# Patient Record
Sex: FEMALE | Race: WHITE | NOT HISPANIC OR LATINO | Employment: PART TIME
[De-identification: names, ages, dates, MRNs, and addresses within clinical notes are randomized per-mention and may not be internally consistent; named-entity substitution may affect disease eponyms.]

---

## 2018-05-11 PROCEDURE — 87086 URINE CULTURE/COLONY COUNT: CPT | Performed by: OBSTETRICS & GYNECOLOGY

## 2018-06-06 PROBLEM — Z98.82 H/O BREAST AUGMENTATION: Status: ACTIVE | Noted: 2018-06-06

## 2018-06-06 PROBLEM — Z34.90 PREGNANT: Status: ACTIVE | Noted: 2018-06-06

## 2018-06-06 PROBLEM — Z34.90 PREGNANT (HCC): Status: ACTIVE | Noted: 2018-06-06

## 2018-06-29 PROBLEM — Z34.03 ENCOUNTER FOR SUPERVISION OF NORMAL FIRST PREGNANCY IN THIRD TRIMESTER (HCC): Status: ACTIVE | Noted: 2018-06-29

## 2018-06-29 PROBLEM — Z34.03 ENCOUNTER FOR SUPERVISION OF NORMAL FIRST PREGNANCY IN THIRD TRIMESTER: Status: ACTIVE | Noted: 2018-06-29

## 2018-06-29 PROCEDURE — 82950 GLUCOSE TEST: CPT | Performed by: OBSTETRICS & GYNECOLOGY

## 2018-06-29 PROCEDURE — 87389 HIV-1 AG W/HIV-1&-2 AB AG IA: CPT | Performed by: OBSTETRICS & GYNECOLOGY

## 2018-07-02 PROBLEM — O99.019 ANTEPARTUM ANEMIA: Status: ACTIVE | Noted: 2018-07-02

## 2018-07-02 PROBLEM — O99.019 ANTEPARTUM ANEMIA (HCC): Status: ACTIVE | Noted: 2018-07-02

## 2018-09-20 ENCOUNTER — HOSPITAL (OUTPATIENT)
Dept: OTHER | Age: 28
End: 2018-09-20
Attending: OBSTETRICS & GYNECOLOGY

## 2018-09-20 LAB
ALBUMIN SERPL-MCNC: 2.1 GM/DL (ref 3.6–5.1)
ALBUMIN/GLOB SERPL: 0.6 {RATIO} (ref 1–2.4)
ALP SERPL-CCNC: 149 UNIT/L (ref 45–117)
ALT SERPL-CCNC: 17 UNIT/L
ANALYZER ANC (IANC): ABNORMAL
ANION GAP SERPL CALC-SCNC: 14 MMOL/L (ref 10–20)
APTT PPP: 25 SECONDS (ref 22–30)
APTT PPP: NORMAL S
AST SERPL-CCNC: 18 UNIT/L
BILIRUB SERPL-MCNC: 0.2 MG/DL (ref 0.2–1)
BUN SERPL-MCNC: 11 MG/DL (ref 6–20)
BUN/CREAT SERPL: 15 (ref 7–25)
CALCIUM SERPL-MCNC: 7.9 MG/DL (ref 8.4–10.2)
CHLORIDE: 103 MMOL/L (ref 98–107)
CO2 SERPL-SCNC: 23 MMOL/L (ref 21–32)
CREAT SERPL-MCNC: 0.75 MG/DL (ref 0.51–0.95)
ERYTHROCYTE [DISTWIDTH] IN BLOOD: 13.1 % (ref 11–15)
FIBRINOGEN PPP-MCNC: 427 MG/DL (ref 190–425)
GLOBULIN SER-MCNC: 3.6 GM/DL (ref 2–4)
GLUCOSE SERPL-MCNC: 82 MG/DL (ref 65–99)
HEMATOCRIT: 31.2 % (ref 36–46.5)
HGB BLD-MCNC: 10.1 GM/DL (ref 12–15.5)
INR PPP: 0.9
MCH RBC QN AUTO: 30.1 PG (ref 26–34)
MCHC RBC AUTO-ENTMCNC: 32.4 GM/DL (ref 32–36.5)
MCV RBC AUTO: 92.9 FL (ref 78–100)
NRBC (NRBCRE): ABNORMAL
ORGANIC ACIDS/CREAT UR-SRTO: 338.39 MG/DL
PLATELET # BLD: 243 THOUSAND/MCL (ref 140–450)
POTASSIUM SERPL-SCNC: 3.8 MMOL/L (ref 3.4–5.1)
PROT SERPL-MCNC: 5.7 GM/DL (ref 6.4–8.2)
PROT UR-MCNC: 31 MG/DL
PROT/CREAT UR: 92 MG/GM CREAT
PROTHROMBIN TIME: 9.4 SECONDS (ref 9.7–11.8)
PROTHROMBIN TIME: ABNORMAL
RBC # BLD: 3.36 MILLION/MCL (ref 4–5.2)
SERVICE CMNT-IMP: ABNORMAL
SODIUM SERPL-SCNC: 136 MMOL/L (ref 135–145)
URATE SERPL-MCNC: 5.4 MG/DL (ref 2.6–5.9)
WBC # BLD: 9.6 THOUSAND/MCL (ref 4.2–11)

## 2018-09-21 LAB
ANALYZER ANC (IANC): ABNORMAL
BASE DEFICIT BLDCOA-SCNC: 3 MMOL/L
BASE DEFICIT BLDCOV-SCNC: 5 MMOL/L
BASE EXCESS BLDCOA CALC-SCNC: NORMAL MMOL/L
BASE EXCESS-RC: ABNORMAL
ERYTHROCYTE [DISTWIDTH] IN BLOOD: 13.2 % (ref 11–15)
HCO3 BLDCOA-SCNC: 26 MMOL/L (ref 21–28)
HCO3 BLDCOV-SCNC: 21 MMOL/L (ref 22–29)
HEMATOCRIT: 26.4 % (ref 36–46.5)
HGB BLD-MCNC: 8.8 GM/DL (ref 12–15.5)
MCH RBC QN AUTO: 31.4 PG (ref 26–34)
MCHC RBC AUTO-ENTMCNC: 33.3 GM/DL (ref 32–36.5)
MCV RBC AUTO: 94.3 FL (ref 78–100)
NRBC (NRBCRE): ABNORMAL
PCO2 BLDCOA: 66 MM HG (ref 31–74)
PCO2 BLDCOV: 44 MM HG (ref 23–49)
PH BLDCOA: 7.21 UNIT (ref 7.18–7.38)
PH BLDCOV: 7.29 UNIT (ref 7.25–7.45)
PLATELET # BLD: 215 THOUSAND/MCL (ref 140–450)
PO2 BLDCOV: 32 MM HG (ref 17–41)
PO2 RC ARTERIAL CORD (RACPO): <20 MM HG (ref 6–31)
RBC # BLD: 2.8 MILLION/MCL (ref 4–5.2)
WBC # BLD: 14.1 THOUSAND/MCL (ref 4.2–11)

## 2018-09-22 LAB
ANALYZER ANC (IANC): ABNORMAL
BASOPHILS # BLD: 0 THOUSAND/MCL (ref 0–0.3)
BASOPHILS NFR BLD: 0 %
DIFFERENTIAL METHOD BLD: ABNORMAL
EOSINOPHIL # BLD: 0.2 THOUSAND/MCL (ref 0.1–0.5)
EOSINOPHIL NFR BLD: 2 %
ERYTHROCYTE [DISTWIDTH] IN BLOOD: 13.3 % (ref 11–15)
HEMATOCRIT: 24.4 % (ref 36–46.5)
HGB BLD-MCNC: 8 GM/DL (ref 12–15.5)
LYMPHOCYTES # BLD: 2.1 THOUSAND/MCL (ref 1–4.8)
LYMPHOCYTES NFR BLD: 20 %
MCH RBC QN AUTO: 30.9 PG (ref 26–34)
MCHC RBC AUTO-ENTMCNC: 32.8 GM/DL (ref 32–36.5)
MCV RBC AUTO: 94.2 FL (ref 78–100)
MONOCYTES # BLD: 0.6 THOUSAND/MCL (ref 0.3–0.9)
MONOCYTES NFR BLD: 6 %
NEUTROPHILS # BLD: 7.4 THOUSAND/MCL (ref 1.8–7.7)
NEUTROPHILS NFR BLD: 72 %
NEUTS SEG NFR BLD: ABNORMAL %
NRBC (NRBCRE): ABNORMAL
PLATELET # BLD: 207 THOUSAND/MCL (ref 140–450)
RBC # BLD: 2.59 MILLION/MCL (ref 4–5.2)
WBC # BLD: 10.3 THOUSAND/MCL (ref 4.2–11)

## 2018-09-24 ENCOUNTER — HOSPITAL (OUTPATIENT)
Dept: OTHER | Age: 28
End: 2018-09-24
Attending: OBSTETRICS & GYNECOLOGY

## 2018-09-24 LAB
ALBUMIN SERPL-MCNC: 2 GM/DL (ref 3.6–5.1)
ALBUMIN/GLOB SERPL: 0.6 {RATIO} (ref 1–2.4)
ALP SERPL-CCNC: 123 UNIT/L (ref 45–117)
ALT SERPL-CCNC: 51 UNIT/L
ANALYZER ANC (IANC): ABNORMAL
ANION GAP SERPL CALC-SCNC: 14 MMOL/L (ref 10–20)
AST SERPL-CCNC: 56 UNIT/L
BILIRUB SERPL-MCNC: 0.3 MG/DL (ref 0.2–1)
BUN SERPL-MCNC: 6 MG/DL (ref 6–20)
BUN/CREAT SERPL: 10 (ref 7–25)
CALCIUM SERPL-MCNC: 8 MG/DL (ref 8.4–10.2)
CHLORIDE: 107 MMOL/L (ref 98–107)
CO2 SERPL-SCNC: 22 MMOL/L (ref 21–32)
CREAT SERPL-MCNC: 0.63 MG/DL (ref 0.51–0.95)
ERYTHROCYTE [DISTWIDTH] IN BLOOD: 13.4 % (ref 11–15)
GLOBULIN SER-MCNC: 3.6 GM/DL (ref 2–4)
GLUCOSE SERPL-MCNC: 94 MG/DL (ref 65–99)
HEMATOCRIT: 26.1 % (ref 36–46.5)
HGB BLD-MCNC: 8.3 GM/DL (ref 12–15.5)
MCH RBC QN AUTO: 30.2 PG (ref 26–34)
MCHC RBC AUTO-ENTMCNC: 31.8 GM/DL (ref 32–36.5)
MCV RBC AUTO: 94.9 FL (ref 78–100)
NRBC (NRBCRE): ABNORMAL
PLATELET # BLD: 292 THOUSAND/MCL (ref 140–450)
POTASSIUM SERPL-SCNC: 4.1 MMOL/L (ref 3.4–5.1)
PROT SERPL-MCNC: 5.6 GM/DL (ref 6.4–8.2)
RBC # BLD: 2.75 MILLION/MCL (ref 4–5.2)
SODIUM SERPL-SCNC: 139 MMOL/L (ref 135–145)
WBC # BLD: 7.6 THOUSAND/MCL (ref 4.2–11)

## 2018-09-25 LAB
ALBUMIN SERPL-MCNC: 2.1 GM/DL (ref 3.6–5.1)
ALBUMIN/GLOB SERPL: 0.6 {RATIO} (ref 1–2.4)
ALP SERPL-CCNC: 116 UNIT/L (ref 45–117)
ALT SERPL-CCNC: 46 UNIT/L
ANALYZER ANC (IANC): ABNORMAL
ANION GAP SERPL CALC-SCNC: 15 MMOL/L (ref 10–20)
AST SERPL-CCNC: 39 UNIT/L
BILIRUB SERPL-MCNC: 0.2 MG/DL (ref 0.2–1)
BUN SERPL-MCNC: 5 MG/DL (ref 6–20)
BUN/CREAT SERPL: 9 (ref 7–25)
CALCIUM SERPL-MCNC: 8 MG/DL (ref 8.4–10.2)
CHLORIDE: 107 MMOL/L (ref 98–107)
CO2 SERPL-SCNC: 22 MMOL/L (ref 21–32)
CREAT SERPL-MCNC: 0.56 MG/DL (ref 0.51–0.95)
ERYTHROCYTE [DISTWIDTH] IN BLOOD: 13.5 % (ref 11–15)
GLOBULIN SER-MCNC: 3.6 GM/DL (ref 2–4)
GLUCOSE SERPL-MCNC: 79 MG/DL (ref 65–99)
HEMATOCRIT: 25.5 % (ref 36–46.5)
HGB BLD-MCNC: 8.3 GM/DL (ref 12–15.5)
MCH RBC QN AUTO: 30.7 PG (ref 26–34)
MCHC RBC AUTO-ENTMCNC: 32.5 GM/DL (ref 32–36.5)
MCV RBC AUTO: 94.4 FL (ref 78–100)
NRBC (NRBCRE): ABNORMAL
PLATELET # BLD: 306 THOUSAND/MCL (ref 140–450)
POTASSIUM SERPL-SCNC: 3.8 MMOL/L (ref 3.4–5.1)
PROT SERPL-MCNC: 5.7 GM/DL (ref 6.4–8.2)
RBC # BLD: 2.7 MILLION/MCL (ref 4–5.2)
SODIUM SERPL-SCNC: 140 MMOL/L (ref 135–145)
WBC # BLD: 8.2 THOUSAND/MCL (ref 4.2–11)

## 2018-11-02 PROCEDURE — 88175 CYTOPATH C/V AUTO FLUID REDO: CPT | Performed by: OBSTETRICS & GYNECOLOGY

## 2019-08-20 ENCOUNTER — WALK IN (OUTPATIENT)
Dept: URGENT CARE | Age: 29
End: 2019-08-20

## 2019-08-20 DIAGNOSIS — J02.9 SORE THROAT: Primary | ICD-10-CM

## 2019-08-20 LAB
INTERNAL PROCEDURAL CONTROLS ACCEPTABLE: YES
S PYO AG THROAT QL IA.RAPID: NEGATIVE

## 2019-08-20 PROCEDURE — 99203 OFFICE O/P NEW LOW 30 MIN: CPT | Performed by: FAMILY MEDICINE

## 2019-08-20 PROCEDURE — 87880 STREP A ASSAY W/OPTIC: CPT | Performed by: FAMILY MEDICINE

## 2019-08-20 ASSESSMENT — PAIN SCALES - GENERAL: PAINLEVEL: 3-4

## 2020-12-27 ENCOUNTER — EMPLOYEE HEALTH (OUTPATIENT)
Dept: OTHER | Age: 30
End: 2020-12-27

## 2021-02-11 ENCOUNTER — WALK IN (OUTPATIENT)
Dept: URGENT CARE | Age: 31
End: 2021-02-11

## 2021-02-11 DIAGNOSIS — S16.1XXA STRAIN OF NECK MUSCLE, INITIAL ENCOUNTER: Primary | ICD-10-CM

## 2021-02-11 PROCEDURE — 99214 OFFICE O/P EST MOD 30 MIN: CPT | Performed by: INTERNAL MEDICINE

## 2021-02-11 RX ORDER — CYCLOBENZAPRINE HCL 10 MG
10 TABLET ORAL 3 TIMES DAILY PRN
Qty: 15 TABLET | Refills: 0 | Status: SHIPPED | OUTPATIENT
Start: 2021-02-11 | End: 2023-08-10 | Stop reason: CLARIF

## 2021-02-11 RX ORDER — DICLOFENAC SODIUM 75 MG/1
75 TABLET, DELAYED RELEASE ORAL 2 TIMES DAILY
Qty: 30 TABLET | Refills: 0 | Status: SHIPPED | OUTPATIENT
Start: 2021-02-11 | End: 2023-08-10 | Stop reason: CLARIF

## 2021-02-11 ASSESSMENT — PAIN SCALES - GENERAL: PAINLEVEL: 1-2

## 2021-05-25 VITALS
RESPIRATION RATE: 16 BRPM | OXYGEN SATURATION: 97 % | HEART RATE: 109 BPM | SYSTOLIC BLOOD PRESSURE: 134 MMHG | HEART RATE: 88 BPM | TEMPERATURE: 98.8 F | OXYGEN SATURATION: 98 % | DIASTOLIC BLOOD PRESSURE: 76 MMHG | DIASTOLIC BLOOD PRESSURE: 77 MMHG | RESPIRATION RATE: 18 BRPM | SYSTOLIC BLOOD PRESSURE: 143 MMHG | TEMPERATURE: 98.4 F

## 2021-11-11 ENCOUNTER — OCC HEALTH (OUTPATIENT)
Dept: OCCUPATIONAL MEDICINE | Age: 31
End: 2021-11-11
Attending: PREVENTIVE MEDICINE

## 2021-11-13 ENCOUNTER — OFFICE VISIT (OUTPATIENT)
Dept: OCCUPATIONAL MEDICINE | Age: 31
End: 2021-11-13
Attending: FAMILY MEDICINE

## 2022-04-24 ENCOUNTER — V-VISIT (OUTPATIENT)
Dept: FAMILY MEDICINE | Age: 32
End: 2022-04-24

## 2022-04-24 DIAGNOSIS — R69 DIAGNOSIS DEFERRED: Primary | ICD-10-CM

## 2022-12-28 ENCOUNTER — EXTERNAL RECORD (OUTPATIENT)
Dept: OTHER | Age: 32
End: 2022-12-28

## 2023-01-01 ENCOUNTER — HOSPITAL ENCOUNTER (OUTPATIENT)
Dept: LAB | Age: 33
Discharge: HOME | End: 2023-01-01

## 2023-01-01 DIAGNOSIS — Z32.00 ENCOUNTER FOR PREGNANCY TEST, RESULT UNKNOWN: Primary | ICD-10-CM

## 2023-01-01 LAB — HCG SERPL-ACNC: 260 MUNITS/ML

## 2023-01-01 PROCEDURE — 36415 COLL VENOUS BLD VENIPUNCTURE: CPT | Performed by: OBSTETRICS & GYNECOLOGY

## 2023-01-01 PROCEDURE — 84702 CHORIONIC GONADOTROPIN TEST: CPT | Performed by: OBSTETRICS & GYNECOLOGY

## 2023-01-05 ENCOUNTER — LAB REQUISITION (OUTPATIENT)
Dept: OCCUPATIONAL MEDICINE | Age: 33
End: 2023-01-05

## 2023-01-05 DIAGNOSIS — Z02.1 ENCOUNTER FOR PRE-EMPLOYMENT EXAMINATION: ICD-10-CM

## 2023-01-05 PROCEDURE — 86480 TB TEST CELL IMMUN MEASURE: CPT | Performed by: CLINICAL MEDICAL LABORATORY

## 2023-01-05 PROCEDURE — PSEU9049 QUANTIFERON TB PLUS: Performed by: CLINICAL MEDICAL LABORATORY

## 2023-01-07 LAB
GAMMA INTERFERON BACKGROUND BLD IA-ACNC: 0.04 IU/ML
M TB IFN-G BLD-IMP: NEGATIVE
M TB IFN-G CD4+ BCKGRND COR BLD-ACNC: 0 IU/ML
M TB IFN-G CD4+CD8+ BCKGRND COR BLD-ACNC: 0 IU/ML
MITOGEN IGNF BCKGRD COR BLD-ACNC: >10 IU/ML

## 2023-06-01 LAB
ABO + RH BLD: NORMAL
BLD GP AB SCN SERPL QL GEL: NEGATIVE
HBV SURFACE AG SER QL: NEGATIVE
HIV 1+2 AB+HIV1 P24 AG SERPL QL IA: NORMAL
RPR SER QL: NORMAL
RUBV IGG SERPL IA-ACNC: POSITIVE

## 2023-06-30 ENCOUNTER — TELEPHONE (OUTPATIENT)
Dept: PERINATAL CARE | Facility: HOSPITAL | Age: 33
End: 2023-06-30

## 2023-08-10 ENCOUNTER — APPOINTMENT (OUTPATIENT)
Dept: GENERAL RADIOLOGY | Age: 33
End: 2023-08-10
Attending: EMERGENCY MEDICINE

## 2023-08-10 ENCOUNTER — HOSPITAL ENCOUNTER (EMERGENCY)
Age: 33
Discharge: HOME | End: 2023-08-10
Attending: EMERGENCY MEDICINE

## 2023-08-10 VITALS
SYSTOLIC BLOOD PRESSURE: 123 MMHG | TEMPERATURE: 98.4 F | WEIGHT: 127.87 LBS | HEIGHT: 66 IN | BODY MASS INDEX: 20.55 KG/M2 | HEART RATE: 89 BPM | RESPIRATION RATE: 16 BRPM | DIASTOLIC BLOOD PRESSURE: 70 MMHG | OXYGEN SATURATION: 97 %

## 2023-08-10 DIAGNOSIS — R07.9 CHEST PAIN, UNSPECIFIED TYPE: Primary | ICD-10-CM

## 2023-08-10 LAB
ALBUMIN SERPL-MCNC: 2.9 G/DL (ref 3.6–5.1)
ALBUMIN/GLOB SERPL: 0.8 {RATIO} (ref 1–2.4)
ALP SERPL-CCNC: 44 UNITS/L (ref 45–117)
ALT SERPL-CCNC: 15 UNITS/L
ANION GAP SERPL CALC-SCNC: 8 MMOL/L (ref 7–19)
AST SERPL-CCNC: 14 UNITS/L
BASOPHILS # BLD: 0 K/MCL (ref 0–0.3)
BASOPHILS NFR BLD: 0 %
BILIRUB SERPL-MCNC: 0.2 MG/DL (ref 0.2–1)
BUN SERPL-MCNC: 8 MG/DL (ref 6–20)
BUN/CREAT SERPL: 15 (ref 7–25)
CALCIUM SERPL-MCNC: 8.2 MG/DL (ref 8.4–10.2)
CHLORIDE SERPL-SCNC: 108 MMOL/L (ref 97–110)
CO2 SERPL-SCNC: 25 MMOL/L (ref 21–32)
CREAT SERPL-MCNC: 0.54 MG/DL (ref 0.51–0.95)
D DIMER PPP FEU-MCNC: 0.37 MG/L (FEU)
DEPRECATED RDW RBC: 43.9 FL (ref 39–50)
EOSINOPHIL # BLD: 0.1 K/MCL (ref 0–0.5)
EOSINOPHIL NFR BLD: 1 %
ERYTHROCYTE [DISTWIDTH] IN BLOOD: 12.6 % (ref 11–15)
FASTING DURATION TIME PATIENT: ABNORMAL H
GFR SERPLBLD BASED ON 1.73 SQ M-ARVRAT: >90 ML/MIN
GLOBULIN SER-MCNC: 3.7 G/DL (ref 2–4)
GLUCOSE SERPL-MCNC: 64 MG/DL (ref 70–99)
HCT VFR BLD CALC: 32.2 % (ref 36–46.5)
HGB BLD-MCNC: 10.5 G/DL (ref 12–15.5)
IMM GRANULOCYTES # BLD AUTO: 0 K/MCL (ref 0–0.2)
IMM GRANULOCYTES # BLD: 0 %
LYMPHOCYTES # BLD: 2.2 K/MCL (ref 1–4.8)
LYMPHOCYTES NFR BLD: 28 %
MCH RBC QN AUTO: 31.3 PG (ref 26–34)
MCHC RBC AUTO-ENTMCNC: 32.6 G/DL (ref 32–36.5)
MCV RBC AUTO: 95.8 FL (ref 78–100)
MONOCYTES # BLD: 0.5 K/MCL (ref 0.3–0.9)
MONOCYTES NFR BLD: 6 %
NEUTROPHILS # BLD: 5.3 K/MCL (ref 1.8–7.7)
NEUTROPHILS NFR BLD: 65 %
NRBC BLD MANUAL-RTO: 0 /100 WBC
PLATELET # BLD AUTO: 315 K/MCL (ref 140–450)
POTASSIUM SERPL-SCNC: 3.8 MMOL/L (ref 3.4–5.1)
PROT SERPL-MCNC: 6.6 G/DL (ref 6.4–8.2)
RBC # BLD: 3.36 MIL/MCL (ref 4–5.2)
SODIUM SERPL-SCNC: 137 MMOL/L (ref 135–145)
TROPONIN I SERPL DL<=0.01 NG/ML-MCNC: <4 NG/L
WBC # BLD: 8.1 K/MCL (ref 4.2–11)

## 2023-08-10 PROCEDURE — 85379 FIBRIN DEGRADATION QUANT: CPT | Performed by: EMERGENCY MEDICINE

## 2023-08-10 PROCEDURE — 85025 COMPLETE CBC W/AUTO DIFF WBC: CPT | Performed by: EMERGENCY MEDICINE

## 2023-08-10 PROCEDURE — 99283 EMERGENCY DEPT VISIT LOW MDM: CPT

## 2023-08-10 PROCEDURE — 84484 ASSAY OF TROPONIN QUANT: CPT | Performed by: EMERGENCY MEDICINE

## 2023-08-10 PROCEDURE — 93005 ELECTROCARDIOGRAM TRACING: CPT | Performed by: EMERGENCY MEDICINE

## 2023-08-10 PROCEDURE — 72072 X-RAY EXAM THORAC SPINE 3VWS: CPT

## 2023-08-10 PROCEDURE — 10002807 HB RX 258: Performed by: EMERGENCY MEDICINE

## 2023-08-10 PROCEDURE — 71046 X-RAY EXAM CHEST 2 VIEWS: CPT

## 2023-08-10 PROCEDURE — 36415 COLL VENOUS BLD VENIPUNCTURE: CPT

## 2023-08-10 PROCEDURE — 80053 COMPREHEN METABOLIC PANEL: CPT | Performed by: EMERGENCY MEDICINE

## 2023-08-10 RX ADMIN — SODIUM CHLORIDE 1000 ML: 9 INJECTION, SOLUTION INTRAVENOUS at 17:08

## 2023-08-11 LAB
ATRIAL RATE (BPM): 93
P AXIS (DEGREES): 53
PR-INTERVAL (MSEC): 102
QRS-INTERVAL (MSEC): 68
QT-INTERVAL (MSEC): 340
QTC: 423
R AXIS (DEGREES): 70
RAINBOW EXTRA TUBES HOLD SPECIMEN: NORMAL
REPORT TEXT: NORMAL
T AXIS (DEGREES): 56
VENTRICULAR RATE EKG/MIN (BPM): 93

## 2023-10-02 LAB
HIV 1+2 AB+HIV1 P24 AG SERPL QL IA: NORMAL
RPR SER QL: NORMAL

## 2023-11-29 LAB — GBS EXTERNAL: NEGATIVE

## 2023-12-20 ENCOUNTER — HOSPITAL ENCOUNTER (INPATIENT)
Age: 33
LOS: 2 days | Discharge: HOME | End: 2023-12-22
Attending: OBSTETRICS & GYNECOLOGY | Admitting: OBSTETRICS & GYNECOLOGY

## 2023-12-20 ENCOUNTER — ANESTHESIA EVENT (OUTPATIENT)
Dept: OBGYN | Age: 33
End: 2023-12-20

## 2023-12-20 ENCOUNTER — ANESTHESIA (OUTPATIENT)
Dept: OBGYN | Age: 33
End: 2023-12-20

## 2023-12-20 LAB
ABO + RH BLD: NORMAL
ALBUMIN SERPL-MCNC: 2.3 G/DL (ref 3.6–5.1)
ALBUMIN/GLOB SERPL: 0.6 {RATIO} (ref 1–2.4)
ALP SERPL-CCNC: 140 UNITS/L (ref 45–117)
ALT SERPL-CCNC: 18 UNITS/L
ANION GAP SERPL CALC-SCNC: 11 MMOL/L (ref 7–19)
AST SERPL-CCNC: 14 UNITS/L
BASOPHILS # BLD: 0 K/MCL (ref 0–0.3)
BASOPHILS NFR BLD: 0 %
BILIRUB SERPL-MCNC: 0.2 MG/DL (ref 0.2–1)
BLD GP AB SCN SERPL QL GEL: NEGATIVE
BUN SERPL-MCNC: 7 MG/DL (ref 6–20)
BUN/CREAT SERPL: 14 (ref 7–25)
CALCIUM SERPL-MCNC: 7.9 MG/DL (ref 8.4–10.2)
CHLORIDE SERPL-SCNC: 109 MMOL/L (ref 97–110)
CO2 SERPL-SCNC: 21 MMOL/L (ref 21–32)
CREAT SERPL-MCNC: 0.49 MG/DL (ref 0.51–0.95)
CREAT UR-MCNC: 170 MG/DL
DEPRECATED RDW RBC: 43.7 FL (ref 39–50)
EGFRCR SERPLBLD CKD-EPI 2021: >90 ML/MIN/{1.73_M2}
EOSINOPHIL # BLD: 0.1 K/MCL (ref 0–0.5)
EOSINOPHIL NFR BLD: 1 %
ERYTHROCYTE [DISTWIDTH] IN BLOOD: 13 % (ref 11–15)
FASTING DURATION TIME PATIENT: ABNORMAL H
FIBRINOGEN PPP-MCNC: 466 MG/DL (ref 190–425)
GLOBULIN SER-MCNC: 3.9 G/DL (ref 2–4)
GLUCOSE SERPL-MCNC: 130 MG/DL (ref 70–99)
HCT VFR BLD CALC: 29.9 % (ref 36–46.5)
HGB BLD-MCNC: 9.6 G/DL (ref 12–15.5)
IMM GRANULOCYTES # BLD AUTO: 0.1 K/MCL (ref 0–0.2)
IMM GRANULOCYTES # BLD: 1 %
LYMPHOCYTES # BLD: 2.4 K/MCL (ref 1–4.8)
LYMPHOCYTES NFR BLD: 27 %
MCH RBC QN AUTO: 29.8 PG (ref 26–34)
MCHC RBC AUTO-ENTMCNC: 32.1 G/DL (ref 32–36.5)
MCV RBC AUTO: 92.9 FL (ref 78–100)
MONOCYTES # BLD: 0.5 K/MCL (ref 0.3–0.9)
MONOCYTES NFR BLD: 6 %
NEUTROPHILS # BLD: 5.7 K/MCL (ref 1.8–7.7)
NEUTROPHILS NFR BLD: 65 %
NRBC BLD MANUAL-RTO: 0 /100 WBC
PLATELET # BLD AUTO: 278 K/MCL (ref 140–450)
POTASSIUM SERPL-SCNC: 3.4 MMOL/L (ref 3.4–5.1)
PROT SERPL-MCNC: 6.2 G/DL (ref 6.4–8.2)
PROT UR-MCNC: 21 MG/DL
PROT/CREAT UR: 124 MGPR/GCR
RAINBOW EXTRA TUBES HOLD SPECIMEN: NORMAL
RBC # BLD: 3.22 MIL/MCL (ref 4–5.2)
SODIUM SERPL-SCNC: 138 MMOL/L (ref 135–145)
TYPE AND SCREEN EXPIRATION DATE: NORMAL
URATE SERPL-MCNC: 3.7 MG/DL (ref 2.6–5.9)
WBC # BLD: 8.7 K/MCL (ref 4.2–11)

## 2023-12-20 PROCEDURE — 13001455 HB PERINATAL CARE HIGH RISK

## 2023-12-20 PROCEDURE — 85384 FIBRINOGEN ACTIVITY: CPT | Performed by: OBSTETRICS & GYNECOLOGY

## 2023-12-20 PROCEDURE — 86901 BLOOD TYPING SEROLOGIC RH(D): CPT | Performed by: OBSTETRICS & GYNECOLOGY

## 2023-12-20 PROCEDURE — 10907ZC DRAINAGE OF AMNIOTIC FLUID, THERAPEUTIC FROM PRODUCTS OF CONCEPTION, VIA NATURAL OR ARTIFICIAL OPENING: ICD-10-PCS | Performed by: OBSTETRICS & GYNECOLOGY

## 2023-12-20 PROCEDURE — 10000003 HB ROOM CHARGE WOMEN'S HEALTH

## 2023-12-20 PROCEDURE — 86900 BLOOD TYPING SEROLOGIC ABO: CPT | Performed by: OBSTETRICS & GYNECOLOGY

## 2023-12-20 PROCEDURE — 84156 ASSAY OF PROTEIN URINE: CPT | Performed by: OBSTETRICS & GYNECOLOGY

## 2023-12-20 PROCEDURE — 13000012 HB ANESTHESIA SPINAL/EPIDURAL IN L&D: Performed by: GENERAL ACUTE CARE HOSPITAL

## 2023-12-20 PROCEDURE — 85025 COMPLETE CBC W/AUTO DIFF WBC: CPT | Performed by: OBSTETRICS & GYNECOLOGY

## 2023-12-20 PROCEDURE — 10002801 HB RX 250 W/O HCPCS: Performed by: GENERAL ACUTE CARE HOSPITAL

## 2023-12-20 PROCEDURE — 84550 ASSAY OF BLOOD/URIC ACID: CPT | Performed by: OBSTETRICS & GYNECOLOGY

## 2023-12-20 PROCEDURE — 80053 COMPREHEN METABOLIC PANEL: CPT | Performed by: OBSTETRICS & GYNECOLOGY

## 2023-12-20 PROCEDURE — 13000001 HB PHASE II RECOVERY EA 30 MINUTES

## 2023-12-20 PROCEDURE — 10002807 HB RX 258: Performed by: OBSTETRICS & GYNECOLOGY

## 2023-12-20 PROCEDURE — 10002800 HB RX 250 W HCPCS: Performed by: OBSTETRICS & GYNECOLOGY

## 2023-12-20 PROCEDURE — 10002800 HB RX 250 W HCPCS: Performed by: GENERAL ACUTE CARE HOSPITAL

## 2023-12-20 PROCEDURE — 13003251 HB VAGINAL DELIVERY HIGH RISK

## 2023-12-20 RX ORDER — LIDOCAINE HYDROCHLORIDE AND EPINEPHRINE 15; 5 MG/ML; UG/ML
INJECTION, SOLUTION EPIDURAL PRN
Status: DISCONTINUED | OUTPATIENT
Start: 2023-12-20 | End: 2023-12-20

## 2023-12-20 RX ORDER — ACETAMINOPHEN 325 MG/1
650 TABLET ORAL EVERY 6 HOURS
Status: DISCONTINUED | OUTPATIENT
Start: 2023-12-20 | End: 2023-12-22 | Stop reason: HOSPADM

## 2023-12-20 RX ORDER — NALBUPHINE HYDROCHLORIDE 10 MG/ML
2.5 INJECTION, SOLUTION INTRAMUSCULAR; INTRAVENOUS; SUBCUTANEOUS
Status: DISCONTINUED | OUTPATIENT
Start: 2023-12-20 | End: 2023-12-20

## 2023-12-20 RX ORDER — HYDROCORTISONE ACETATE PRAMOXINE HCL 2.5; 1 G/100G; G/100G
CREAM TOPICAL 3 TIMES DAILY PRN
Status: DISCONTINUED | OUTPATIENT
Start: 2023-12-20 | End: 2023-12-22 | Stop reason: HOSPADM

## 2023-12-20 RX ORDER — IBUPROFEN 600 MG/1
600 TABLET ORAL EVERY 6 HOURS
Status: DISCONTINUED | OUTPATIENT
Start: 2023-12-21 | End: 2023-12-22 | Stop reason: HOSPADM

## 2023-12-20 RX ORDER — ONDANSETRON 2 MG/ML
4 INJECTION INTRAMUSCULAR; INTRAVENOUS 2 TIMES DAILY PRN
Status: DISCONTINUED | OUTPATIENT
Start: 2023-12-20 | End: 2023-12-22 | Stop reason: HOSPADM

## 2023-12-20 RX ORDER — BUPIVACAINE HYDROCHLORIDE 2.5 MG/ML
10 INJECTION, SOLUTION EPIDURAL; INFILTRATION; INTRACAUDAL ONCE
Status: COMPLETED | OUTPATIENT
Start: 2023-12-20 | End: 2023-12-20

## 2023-12-20 RX ORDER — CALCIUM CARBONATE 500 MG/1
500 TABLET, CHEWABLE ORAL EVERY 4 HOURS PRN
Status: DISCONTINUED | OUTPATIENT
Start: 2023-12-20 | End: 2023-12-22 | Stop reason: HOSPADM

## 2023-12-20 RX ORDER — 0.9 % SODIUM CHLORIDE 0.9 %
2 VIAL (ML) INJECTION EVERY 12 HOURS SCHEDULED
Status: DISCONTINUED | OUTPATIENT
Start: 2023-12-20 | End: 2023-12-20

## 2023-12-20 RX ORDER — SIMETHICONE 125 MG
125 TABLET,CHEWABLE ORAL EVERY 4 HOURS PRN
Status: DISCONTINUED | OUTPATIENT
Start: 2023-12-20 | End: 2023-12-22 | Stop reason: HOSPADM

## 2023-12-20 RX ORDER — BUPIVACAINE HYDROCHLORIDE 2.5 MG/ML
INJECTION, SOLUTION EPIDURAL; INFILTRATION; INTRACAUDAL PRN
Status: DISCONTINUED | OUTPATIENT
Start: 2023-12-20 | End: 2023-12-20

## 2023-12-20 RX ORDER — OXYTOCIN 10 [USP'U]/ML
10 INJECTION, SOLUTION INTRAMUSCULAR; INTRAVENOUS
Status: DISCONTINUED | OUTPATIENT
Start: 2023-12-20 | End: 2023-12-22 | Stop reason: HOSPADM

## 2023-12-20 RX ORDER — KETOROLAC TROMETHAMINE 30 MG/ML
30 INJECTION, SOLUTION INTRAMUSCULAR; INTRAVENOUS ONCE
Status: COMPLETED | OUTPATIENT
Start: 2023-12-20 | End: 2023-12-20

## 2023-12-20 RX ORDER — VITAMIN A ACETATE, BETA CAROTENE, ASCORBIC ACID, CHOLECALCIFEROL, .ALPHA.-TOCOPHEROL ACETATE, DL-, THIAMINE MONONITRATE, RIBOFLAVIN, NIACINAMIDE, PYRIDOXINE HYDROCHLORIDE, FOLIC ACID, CYANOCOBALAMIN, CALCIUM CARBONATE, FERROUS FUMARATE, ZINC OXIDE, CUPRIC OXIDE 3080; 12; 120; 400; 1; 1.84; 3; 20; 22; 920; 25; 200; 27; 10; 2 [IU]/1; UG/1; MG/1; [IU]/1; MG/1; MG/1; MG/1; MG/1; MG/1; [IU]/1; MG/1; MG/1; MG/1; MG/1; MG/1
1 TABLET, FILM COATED ORAL DAILY
COMMUNITY

## 2023-12-20 RX ORDER — ONDANSETRON 2 MG/ML
4 INJECTION INTRAMUSCULAR; INTRAVENOUS 2 TIMES DAILY PRN
Status: DISCONTINUED | OUTPATIENT
Start: 2023-12-20 | End: 2023-12-20

## 2023-12-20 RX ORDER — OXYTOCIN 10 [USP'U]/ML
10 INJECTION, SOLUTION INTRAMUSCULAR; INTRAVENOUS
Status: DISCONTINUED | OUTPATIENT
Start: 2023-12-20 | End: 2023-12-20

## 2023-12-20 RX ORDER — LIDOCAINE HYDROCHLORIDE 10 MG/ML
30 INJECTION, SOLUTION EPIDURAL; INFILTRATION; INTRACAUDAL; PERINEURAL
Status: DISCONTINUED | OUTPATIENT
Start: 2023-12-20 | End: 2023-12-20

## 2023-12-20 RX ORDER — OXYTOCIN-SODIUM CHLORIDE 0.9% IV SOLN 30 UNIT/500ML 30-0.9/5 UT/ML-%
0-334 SOLUTION INTRAVENOUS CONTINUOUS
Status: DISCONTINUED | OUTPATIENT
Start: 2023-12-20 | End: 2023-12-20

## 2023-12-20 RX ORDER — OXYTOCIN-SODIUM CHLORIDE 0.9% IV SOLN 30 UNIT/500ML 30-0.9/5 UT/ML-%
0-334 SOLUTION INTRAVENOUS CONTINUOUS
Status: DISCONTINUED | OUTPATIENT
Start: 2023-12-20 | End: 2023-12-22 | Stop reason: HOSPADM

## 2023-12-20 RX ORDER — OXYTOCIN/0.9 % SODIUM CHLORIDE 30/500 ML
0-25 PLASTIC BAG, INJECTION (ML) INTRAVENOUS CONTINUOUS
Status: DISCONTINUED | OUTPATIENT
Start: 2023-12-20 | End: 2023-12-20

## 2023-12-20 RX ORDER — BUPIVACAINE HYDROCHLORIDE 2.5 MG/ML
INJECTION, SOLUTION EPIDURAL; INFILTRATION; INTRACAUDAL
Status: COMPLETED
Start: 2023-12-20 | End: 2023-12-20

## 2023-12-20 RX ORDER — HEPARIN SOD,PORK IN 0.45% NACL 25000/500
INTRAVENOUS SOLUTION INTRAVENOUS CONTINUOUS
Status: DISCONTINUED | OUTPATIENT
Start: 2023-12-20 | End: 2023-12-20

## 2023-12-20 RX ORDER — SODIUM CHLORIDE, SODIUM LACTATE, POTASSIUM CHLORIDE, CALCIUM CHLORIDE 600; 310; 30; 20 MG/100ML; MG/100ML; MG/100ML; MG/100ML
INJECTION, SOLUTION INTRAVENOUS CONTINUOUS
Status: DISCONTINUED | OUTPATIENT
Start: 2023-12-20 | End: 2023-12-20

## 2023-12-20 RX ORDER — CALCIUM CARBONATE 500 MG/1
500 TABLET, CHEWABLE ORAL EVERY 4 HOURS PRN
Status: DISCONTINUED | OUTPATIENT
Start: 2023-12-20 | End: 2023-12-20

## 2023-12-20 RX ADMIN — SODIUM CHLORIDE, POTASSIUM CHLORIDE, SODIUM LACTATE AND CALCIUM CHLORIDE: 600; 310; 30; 20 INJECTION, SOLUTION INTRAVENOUS at 06:24

## 2023-12-20 RX ADMIN — LIDOCAINE HYDROCHLORIDE,EPINEPHRINE BITARTRATE 2 ML: 15; .005 INJECTION, SOLUTION EPIDURAL; INFILTRATION; INTRACAUDAL; PERINEURAL at 11:49

## 2023-12-20 RX ADMIN — Medication 10 ML/HR: at 11:49

## 2023-12-20 RX ADMIN — Medication 200 MCG: at 19:20

## 2023-12-20 RX ADMIN — SODIUM CHLORIDE, POTASSIUM CHLORIDE, SODIUM LACTATE AND CALCIUM CHLORIDE: 600; 310; 30; 20 INJECTION, SOLUTION INTRAVENOUS at 11:29

## 2023-12-20 RX ADMIN — BUPIVACAINE HYDROCHLORIDE 10 ML: 2.5 INJECTION, SOLUTION EPIDURAL; INFILTRATION; INTRACAUDAL at 14:07

## 2023-12-20 RX ADMIN — BUPIVACAINE HYDROCHLORIDE 5 ML: 2.5 INJECTION, SOLUTION EPIDURAL; INFILTRATION; INTRACAUDAL; PERINEURAL at 12:09

## 2023-12-20 RX ADMIN — BUPIVACAINE HYDROCHLORIDE 5 ML: 2.5 INJECTION, SOLUTION EPIDURAL; INFILTRATION; INTRACAUDAL; PERINEURAL at 11:49

## 2023-12-20 RX ADMIN — LIDOCAINE HYDROCHLORIDE,EPINEPHRINE BITARTRATE 3 ML: 15; .005 INJECTION, SOLUTION EPIDURAL; INFILTRATION; INTRACAUDAL; PERINEURAL at 11:47

## 2023-12-20 RX ADMIN — BUPIVACAINE HYDROCHLORIDE 10 ML: 2.5 INJECTION, SOLUTION EPIDURAL; INFILTRATION; INTRACAUDAL at 13:57

## 2023-12-20 RX ADMIN — Medication 2 MILLI-UNITS/MIN: at 06:25

## 2023-12-20 RX ADMIN — Medication 95 ML/HR: at 22:55

## 2023-12-20 RX ADMIN — KETOROLAC TROMETHAMINE 30 MG: 30 INJECTION, SOLUTION INTRAMUSCULAR at 20:47

## 2023-12-20 RX ADMIN — SODIUM CHLORIDE, POTASSIUM CHLORIDE, SODIUM LACTATE AND CALCIUM CHLORIDE: 600; 310; 30; 20 INJECTION, SOLUTION INTRAVENOUS at 18:06

## 2023-12-20 SDOH — ECONOMIC STABILITY: HOUSING INSECURITY: DO YOU HAVE PROBLEMS WITH ANY OF THE FOLLOWING?: NONE OF THE ABOVE

## 2023-12-20 SDOH — ECONOMIC STABILITY: FOOD INSECURITY: WITHIN THE PAST 12 MONTHS, THE FOOD YOU BOUGHT JUST DIDN'T LAST AND YOU DIDN'T HAVE MONEY TO GET MORE.: NEVER TRUE

## 2023-12-20 SDOH — ECONOMIC STABILITY: HOUSING INSECURITY: WHAT IS YOUR LIVING SITUATION TODAY?: I HAVE A STEADY PLACE TO LIVE

## 2023-12-20 SDOH — HEALTH STABILITY: GENERAL: BECAUSE OF A PHYSICAL, MENTAL, OR EMOTIONAL CONDITION, DO YOU HAVE DIFFICULTY DOING ERRANDS ALONE?: NO

## 2023-12-20 SDOH — ECONOMIC STABILITY: GENERAL: WOULD YOU LIKE HELP WITH ANY OF THE FOLLOWING NEEDS?: I DON'T WANT HELP WITH ANY OF THESE

## 2023-12-20 SDOH — ECONOMIC STABILITY: HOUSING INSECURITY: WHAT IS YOUR LIVING SITUATION TODAY?: SPOUSE;CHILDREN

## 2023-12-20 SDOH — ECONOMIC STABILITY: TRANSPORTATION INSECURITY
IN THE PAST 12 MONTHS, HAS LACK OF RELIABLE TRANSPORTATION KEPT YOU FROM MEDICAL APPOINTMENTS, MEETINGS, WORK OR FROM GETTING THINGS NEEDED FOR DAILY LIVING?: NO

## 2023-12-20 SDOH — SOCIAL STABILITY: SOCIAL NETWORK: SUPPORT SYSTEMS: SIGNIFICANT OTHER;FAMILY MEMBERS;PARENT

## 2023-12-20 SDOH — HEALTH STABILITY: GENERAL
BECAUSE OF A PHYSICAL, MENTAL, OR EMOTIONAL CONDITION, DO YOU HAVE SERIOUS DIFFICULTY CONCENTRATING, REMEMBERING OR MAKING DECISIONS?: NO

## 2023-12-20 SDOH — ECONOMIC STABILITY: HOUSING INSECURITY: WHAT IS YOUR LIVING SITUATION TODAY?: HOUSE

## 2023-12-20 SDOH — SOCIAL STABILITY: SOCIAL NETWORK
HOW OFTEN DO YOU SEE OR TALK TO PEOPLE THAT YOU CARE ABOUT AND FEEL CLOSE TO? (FOR EXAMPLE: TALKING TO FRIENDS ON THE PHONE, VISITING FRIENDS OR FAMILY, GOING TO CHURCH OR CLUB MEETINGS): 5 OR MORE TIMES A WEEK

## 2023-12-20 SDOH — ECONOMIC STABILITY: GENERAL

## 2023-12-20 SDOH — SOCIAL STABILITY: SOCIAL NETWORK: SUPPORT SYSTEMS: SPOUSE;FAMILY MEMBERS;CHILDREN

## 2023-12-20 SDOH — HEALTH STABILITY: PHYSICAL HEALTH: DO YOU HAVE SERIOUS DIFFICULTY WALKING OR CLIMBING STAIRS?: NO

## 2023-12-20 SDOH — HEALTH STABILITY: PHYSICAL HEALTH: DO YOU HAVE DIFFICULTY DRESSING OR BATHING?: NO

## 2023-12-20 SDOH — SOCIAL STABILITY: SOCIAL INSECURITY: HOW OFTEN DOES ANYONE, INCLUDING FAMILY AND FRIENDS, PHYSICALLY HURT YOU?: NEVER

## 2023-12-20 SDOH — SOCIAL STABILITY: SOCIAL INSECURITY: HOW OFTEN DOES ANYONE, INCLUDING FAMILY AND FRIENDS, INSULT OR TALK DOWN TO YOU?: NEVER

## 2023-12-20 SDOH — SOCIAL STABILITY: SOCIAL INSECURITY: HOW OFTEN DOES ANYONE, INCLUDING FAMILY AND FRIENDS, THREATEN YOU WITH HARM?: NEVER

## 2023-12-20 SDOH — SOCIAL STABILITY: SOCIAL INSECURITY: HOW OFTEN DOES ANYONE, INCLUDING FAMILY AND FRIENDS, SCREAM OR CURSE AT YOU?: NEVER

## 2023-12-20 ASSESSMENT — LIFESTYLE VARIABLES
HOW OFTEN DO YOU HAVE 6 OR MORE DRINKS ON ONE OCCASION: NEVER
HOW OFTEN DO YOU HAVE A DRINK CONTAINING ALCOHOL: NEVER
HOW MANY STANDARD DRINKS CONTAINING ALCOHOL DO YOU HAVE ON A TYPICAL DAY: 0,1 OR 2
AUDIT-C TOTAL SCORE: 0
ALCOHOL_USE_STATUS: NO OR LOW RISK WITH VALIDATED TOOL

## 2023-12-20 ASSESSMENT — ACTIVITIES OF DAILY LIVING (ADL)
ADL_SHORT_OF_BREATH: NO
ADL_BEFORE_ADMISSION: INDEPENDENT
RECENT_DECLINE_ADL: NO
ADL_SCORE: 12

## 2023-12-20 ASSESSMENT — PAIN SCALES - GENERAL
PAINLEVEL_OUTOF10: 0
PAINLEVEL_OUTOF10: 1

## 2023-12-21 LAB
DEPRECATED RDW RBC: 43.9 FL (ref 39–50)
ERYTHROCYTE [DISTWIDTH] IN BLOOD: 12.8 % (ref 11–15)
HCT VFR BLD CALC: 26.3 % (ref 36–46.5)
HGB BLD-MCNC: 8.3 G/DL (ref 12–15.5)
MCH RBC QN AUTO: 29.5 PG (ref 26–34)
MCHC RBC AUTO-ENTMCNC: 31.6 G/DL (ref 32–36.5)
MCV RBC AUTO: 93.6 FL (ref 78–100)
NRBC BLD MANUAL-RTO: 0 /100 WBC
PLATELET # BLD AUTO: 235 K/MCL (ref 140–450)
RBC # BLD: 2.81 MIL/MCL (ref 4–5.2)
WBC # BLD: 15.4 K/MCL (ref 4.2–11)

## 2023-12-21 PROCEDURE — 36415 COLL VENOUS BLD VENIPUNCTURE: CPT | Performed by: OBSTETRICS & GYNECOLOGY

## 2023-12-21 PROCEDURE — 85027 COMPLETE CBC AUTOMATED: CPT | Performed by: OBSTETRICS & GYNECOLOGY

## 2023-12-21 PROCEDURE — 10004651 HB RX, NO CHARGE ITEM: Performed by: OBSTETRICS & GYNECOLOGY

## 2023-12-21 PROCEDURE — 10002803 HB RX 637: Performed by: OBSTETRICS & GYNECOLOGY

## 2023-12-21 PROCEDURE — 10000003 HB ROOM CHARGE WOMEN'S HEALTH

## 2023-12-21 RX ADMIN — IBUPROFEN 600 MG: 600 TABLET, FILM COATED ORAL at 05:56

## 2023-12-21 RX ADMIN — ACETAMINOPHEN 650 MG: 325 TABLET ORAL at 15:53

## 2023-12-21 RX ADMIN — ACETAMINOPHEN 650 MG: 325 TABLET ORAL at 10:03

## 2023-12-21 RX ADMIN — ACETAMINOPHEN 650 MG: 325 TABLET ORAL at 04:11

## 2023-12-21 RX ADMIN — IBUPROFEN 600 MG: 600 TABLET, FILM COATED ORAL at 18:16

## 2023-12-21 RX ADMIN — IBUPROFEN 600 MG: 600 TABLET, FILM COATED ORAL at 11:46

## 2023-12-21 ASSESSMENT — PAIN SCALES - GENERAL
PAINLEVEL_OUTOF10: 2
PAINLEVEL_OUTOF10: 1
PAINLEVEL_OUTOF10: 2
PAINLEVEL_OUTOF10: 3
PAINLEVEL_OUTOF10: 2
PAINLEVEL_OUTOF10: 2
PAINLEVEL_OUTOF10: 3
PAINLEVEL_OUTOF10: 4

## 2023-12-21 ASSESSMENT — EDINBURGH POSTNATAL DEPRESSION SCALE (EPDS)
I HAVE BEEN SO UNHAPPY THAT I HAVE BEEN CRYING: NO, NEVER
I HAVE LOOKED FORWARD WITH ENJOYMENT TO THINGS: AS MUCH AS I EVER DID
I HAVE BLAMED MYSELF UNNECESSARILY WHEN THINGS WENT WRONG: NO, NEVER
I HAVE BEEN ANXIOUS OR WORRIED FOR NO GOOD REASON: HARDLY EVER
I HAVE BEEN ABLE TO LAUGH AND SEE THE FUNNY SIDE OF THINGS: AS MUCH AS I ALWAYS COULD
I HAVE FELT SCARED OR PANICKY FOR NO GOOD REASON: NO, NOT AT ALL
TOTAL SCORE: 1
I HAVE FELT SAD OR MISERABLE: NO, NOT AT ALL
THINGS HAVE BEEN GETTING ON TOP OF ME: NO, I HAVE BEEN COPING AS WELL AS EVER
THE THOUGHT OF HARMING MYSELF HAS OCCURRED TO ME: NEVER
I HAVE BEEN SO UNHAPPY THAT I HAVE HAD DIFFICULTY SLEEPING: NOT AT ALL

## 2023-12-22 VITALS
HEIGHT: 60 IN | DIASTOLIC BLOOD PRESSURE: 80 MMHG | RESPIRATION RATE: 16 BRPM | OXYGEN SATURATION: 100 % | BODY MASS INDEX: 30.04 KG/M2 | HEART RATE: 82 BPM | SYSTOLIC BLOOD PRESSURE: 131 MMHG | WEIGHT: 153 LBS | TEMPERATURE: 97.5 F

## 2023-12-22 PROCEDURE — 10004651 HB RX, NO CHARGE ITEM: Performed by: OBSTETRICS & GYNECOLOGY

## 2023-12-22 PROCEDURE — 10002803 HB RX 637: Performed by: OBSTETRICS & GYNECOLOGY

## 2023-12-22 RX ORDER — ACETAMINOPHEN 325 MG/1
650 TABLET ORAL EVERY 6 HOURS
Status: SHIPPED | COMMUNITY
Start: 2023-12-22

## 2023-12-22 RX ORDER — IBUPROFEN 200 MG
600 TABLET ORAL EVERY 6 HOURS
Status: SHIPPED | COMMUNITY
Start: 2023-12-22

## 2023-12-22 RX ADMIN — IBUPROFEN 600 MG: 600 TABLET, FILM COATED ORAL at 00:11

## 2023-12-22 RX ADMIN — ACETAMINOPHEN 650 MG: 325 TABLET ORAL at 00:10

## 2023-12-22 ASSESSMENT — PAIN SCALES - GENERAL
PAINLEVEL_OUTOF10: 1
PAINLEVEL_OUTOF10: 2

## 2024-01-04 ENCOUNTER — HOSPITAL ENCOUNTER (OUTPATIENT)
Dept: LACTATION | Age: 34
Discharge: HOME | End: 2024-01-04
Attending: OBSTETRICS & GYNECOLOGY

## 2024-01-04 PROCEDURE — 99212 OFFICE O/P EST SF 10 MIN: CPT

## 2024-03-28 ENCOUNTER — OFFICE VISIT (OUTPATIENT)
Dept: OPHTHALMOLOGY | Age: 34
End: 2024-03-28

## 2024-03-28 DIAGNOSIS — H00.011 HORDEOLUM EXTERNUM OF RIGHT UPPER EYELID: Primary | ICD-10-CM

## 2024-03-28 PROCEDURE — 99203 OFFICE O/P NEW LOW 30 MIN: CPT | Performed by: OPHTHALMOLOGY

## 2024-03-28 RX ORDER — CEPHALEXIN 500 MG/1
500 CAPSULE ORAL 3 TIMES DAILY
Qty: 21 CAPSULE | Refills: 0 | Status: SHIPPED | OUTPATIENT
Start: 2024-03-28 | End: 2024-04-04

## 2024-03-28 RX ORDER — ERYTHROMYCIN 5 MG/G
0.5 OINTMENT OPHTHALMIC
COMMUNITY
Start: 2024-03-23 | End: 2024-03-30

## 2024-03-28 ASSESSMENT — VISUAL ACUITY
OD_SC: 20/20
OS_SC+: -1
OS_SC: 20/20
METHOD: SNELLEN - LINEAR

## 2024-03-28 ASSESSMENT — EXTERNAL EXAM - LEFT EYE: OS_EXAM: NORMAL

## 2024-03-28 ASSESSMENT — SLIT LAMP EXAM - LIDS
COMMENTS: NORMAL
COMMENTS: NORMAL

## 2024-03-30 ENCOUNTER — APPOINTMENT (OUTPATIENT)
Dept: OPTOMETRY | Age: 34
End: 2024-03-30

## 2024-04-29 ENCOUNTER — OFFICE VISIT (OUTPATIENT)
Dept: FAMILY MEDICINE CLINIC | Facility: CLINIC | Age: 34
End: 2024-04-29
Payer: COMMERCIAL

## 2024-04-29 VITALS
DIASTOLIC BLOOD PRESSURE: 74 MMHG | RESPIRATION RATE: 16 BRPM | TEMPERATURE: 98 F | OXYGEN SATURATION: 98 % | SYSTOLIC BLOOD PRESSURE: 120 MMHG | HEART RATE: 90 BPM

## 2024-04-29 DIAGNOSIS — J02.9 SORE THROAT: Primary | ICD-10-CM

## 2024-04-29 DIAGNOSIS — H00.011 HORDEOLUM EXTERNUM OF RIGHT UPPER EYELID: ICD-10-CM

## 2024-04-29 LAB
CONTROL LINE PRESENT WITH A CLEAR BACKGROUND (YES/NO): YES YES/NO
KIT LOT #: NORMAL NUMERIC
STREP GRP A CUL-SCR: NEGATIVE

## 2024-04-29 RX ORDER — CEPHALEXIN 500 MG/1
500 CAPSULE ORAL 3 TIMES DAILY
Qty: 21 CAPSULE | Refills: 0 | Status: SHIPPED | OUTPATIENT
Start: 2024-04-29 | End: 2024-05-06

## 2024-04-29 RX ORDER — ERYTHROMYCIN 5 MG/G
OINTMENT OPHTHALMIC
Qty: 3.5 G | Refills: 0 | Status: SHIPPED | OUTPATIENT
Start: 2024-04-29 | End: 2024-05-09

## 2024-04-29 NOTE — PATIENT INSTRUCTIONS
Keflex 500 mg three time daily for 7 days.  Replace toothbrush as added precaution 24 hours after starting antibiotic.   Erythromycin ointment as prescribed for sty.   Continue warm compresses.   Follow up with eye doctor for recheck of right upper eyelid given recurrent symptoms.       Rapid strep screen is negative.       Follow up with your primary care provider if your symptoms fail to improve and resolve as anticipated    Go to the Immediate Care or Emergency Department in event of new or worsening symptoms at any time

## 2024-04-29 NOTE — PROGRESS NOTES
CHIEF COMPLAINT:   No chief complaint on file.        HPI:   Cindy Jones is a 34 year old female presents to clinic with mutliple complaints;   1.  Sore throat x 1d ay, associate with body aches/mild nausea.  Denies nasal congestion, no v/d, no cough, no rash, no dysphagia.  No known strep contacts.  S/p tonsillectomy at 21 yoa.   2.  R upper eyelid redness/drainage with swelling x 1 day. Dx/tx R upper eyelid stye x 1 month ago, completed oral and topical abx per opthalmology.  Sx improved, had diminished to small painless bump on eyelid. Yesterday noted increased swelling/pain over same area, warm compresses followed by drainage of yellow/green discharge with reduction in swelling.  Denies vision changes, no photophobia, no pain with EOM.  No contact/corrective lens use.  Son noted to have more \"boogers\" in eye with possible pink eye.  No other complaints.     Current Outpatient Medications   Medication Sig Dispense Refill    cephalexin (KEFLEX) 500 MG Oral Cap Take 1 capsule (500 mg total) by mouth 3 (three) times daily for 7 days. 21 capsule 0    erythromycin 5 MG/GM Ophthalmic Ointment Apply 1 cm ribbon to in affected eye(s) qid x 7 days. 3.5 g 0    Nitrofurantoin Monohyd Macro 100 MG Oral Cap Take 1 capsule (100 mg total) by mouth 2 (two) times daily for 5 days. 10 capsule 0    NIFEdipine ER Osmotic Release (PROCARDIA XL) 30 MG Oral Tablet 24 Hr Take 1 tablet (30 mg total) by mouth nightly. 30 tablet 0    Labetalol HCl 200 MG Oral Tab Take 1 tablet (200 mg total) by mouth 2 (two) times daily. 60 tablet 0    Prenatal Multivit-Min-Fe-FA (PRENATAL OR) Take by mouth.        No past medical history on file.   Social History:  Social History     Socioeconomic History    Marital status:    Tobacco Use    Smoking status: Never    Smokeless tobacco: Never   Substance and Sexual Activity    Alcohol use: No    Drug use: No     Social Determinants of Health     Financial Resource Strain: Low Risk  (12/20/2023)     Received from [facility]    Financial Resource Strain     In the past year, have you or any family members you live with been unable to get any of the following when it was really needed? Check all that apply.: None   Food Insecurity: Low Risk  (12/20/2023)    Received from [facility]    Food Insecurity     Within the past 12 months, you worried that your food would run out before you got money to buy more.  : Never true     Within the past 12 months, the food you bought just didn't last and you didn't have money to get more. : Never true   Transportation Needs: Not At Risk (12/20/2023)    Received from [facility]    Transportation Needs     In the past 12 months, has lack of reliable transportation kept you from medical appointments, meetings, work or from getting things needed for daily living? : No   Social Connections: Low Risk  (12/20/2023)    Received from [facility]    Social Connections     How often do you see or talk to people that you care about and feel close to? (For example: talking to friends on the phone, visiting friends or family, going to [employer] or club meetings): 5 or more times a week    Received from [facility]    Housing Stability        REVIEW OF SYSTEMS:   GENERAL HEALTH: normal appetite  SKIN: Per HPI  HEENT: denies ear pain, See HPI  RESPIRATORY: denies shortness of breath or wheezing  CARDIOVASCULAR: denies chest pain or palpitations   GI: denies vomiting or diarrhea  NEURO: denies dizziness or lightheadedness    EXAM:   /74   Pulse 90   Temp 97.7 °F (36.5 °C) (Temporal)   Resp 16   LMP  (LMP Unknown)   SpO2 98%   GENERAL: well developed, well nourished,in no apparent distress  SKIN: no rashes,no suspicious lesions  HEAD: atraumatic, normocephalic  EYES:  R bulbar, palpebral conjunctival injection, corneas clear, R upper lid ptosis with papule noted at lateral margin, no pustules, no vesicles, no proptosis, no  photophobia.  EOM intact.   EARS: TM's clear, non-injected, no bulging, retraction, or fluid bilaterally  NOSE: nostrils patent, clear nasal discharge, nasal mucosa pink/moist  THROAT: oral mucosa pink, moist. Posterior pharynx moderately injected, without exudates, tonsils surgically absent, uvula midline.   NECK: supple  LUNGS: clear to auscultation bilaterally, no wheezes or rhonchi. Breathing is non labored.  CARDIO: RRR without murmur  EXTREMITIES: no cyanosis, clubbing or edema  LYMPH: shotty anterior cervical. no submandibular lymphadenopathy. No pre/post auricular LAD,  No posterior cervical or occipital lymphadenopathy.    Recent Results (from the past 24 hour(s))   Strep A Assay W/Optic    Collection Time: 04/29/24  9:30 AM   Result Value Ref Range    Strep Grp A Screen Negative Negative    Control Line Present with a clear background (yes/no) yes Yes/No    Kit Lot # [id number] Numeric    Kit Expiration Date 03/01/2025 Date         ASSESSMENT AND PLAN:   Assessment:   Encounter Diagnoses   Name Primary?    Sore throat Yes    Hordeolum externum of right upper eyelid        Plan:   Chart reviewed, [facility] chart reviewed from 3/28/24. Completed 7 day course of keflex for external hordeolum. Will tx similarly today with topical e-mycin ointment  Rapid strep negative, throat cx deferred given keflex tx for ext hordeolum which will also cover strep.  Plan discussed with pt who agrees with tx, declines throat cx.   Advised f/u with ophthalmology if symptoms fail to continue to improve or immediately with new/worsening ocular symptoms.       Comfort measures explained and discussed as listed in Patient Instructions    Follow up with PCP in 3-5 days if not improving, condition worsens, or fever greater than or equal to 100.4 persists for 72 hours.    The patient/parent indicates understanding of these issues and agrees to the plan.    Patient Instructions    Keflex 500 mg three time daily for 7 days.   Replace toothbrush as added precaution 24 hours after starting antibiotic.   Erythromycin ointment as prescribed for sty.   Continue warm compresses.   Follow up with eye doctor for recheck of right upper eyelid given recurrent symptoms.       Rapid strep screen is negative.       Follow up with your primary care provider if your symptoms fail to improve and resolve as anticipated    Go to the Immediate Care or Emergency Department in event of new or worsening symptoms at any time         Toya De Souza PA-C

## 2024-07-09 ENCOUNTER — E-ADVICE (OUTPATIENT)
Dept: OPHTHALMOLOGY | Age: 34
End: 2024-07-09

## 2024-07-09 RX ORDER — CEPHALEXIN 500 MG/1
500 CAPSULE ORAL 3 TIMES DAILY
Qty: 21 CAPSULE | Refills: 0 | Status: SHIPPED | OUTPATIENT
Start: 2024-07-09 | End: 2024-07-16

## 2024-09-11 ENCOUNTER — OFFICE VISIT (OUTPATIENT)
Dept: FAMILY MEDICINE CLINIC | Facility: CLINIC | Age: 34
End: 2024-09-11
Payer: COMMERCIAL

## 2024-09-11 VITALS
RESPIRATION RATE: 18 BRPM | HEART RATE: 104 BPM | DIASTOLIC BLOOD PRESSURE: 84 MMHG | SYSTOLIC BLOOD PRESSURE: 122 MMHG | TEMPERATURE: 98 F | OXYGEN SATURATION: 97 % | BODY MASS INDEX: 17.68 KG/M2 | HEIGHT: 66 IN | WEIGHT: 110 LBS

## 2024-09-11 DIAGNOSIS — J05.0 CROUP: Primary | ICD-10-CM

## 2024-09-11 DIAGNOSIS — J02.9 SORE THROAT: ICD-10-CM

## 2024-09-11 DIAGNOSIS — R05.9 COUGH, UNSPECIFIED TYPE: ICD-10-CM

## 2024-09-11 LAB
CONTROL LINE PRESENT WITH A CLEAR BACKGROUND (YES/NO): YES YES/NO
KIT LOT #: NORMAL NUMERIC
OPERATOR ID: NORMAL
POCT LOT NUMBER: NORMAL
RAPID SARS-COV-2 BY PCR: NOT DETECTED

## 2024-09-11 PROCEDURE — 87880 STREP A ASSAY W/OPTIC: CPT | Performed by: PHYSICIAN ASSISTANT

## 2024-09-11 PROCEDURE — U0002 COVID-19 LAB TEST NON-CDC: HCPCS | Performed by: PHYSICIAN ASSISTANT

## 2024-09-11 PROCEDURE — 3079F DIAST BP 80-89 MM HG: CPT | Performed by: PHYSICIAN ASSISTANT

## 2024-09-11 PROCEDURE — 99213 OFFICE O/P EST LOW 20 MIN: CPT | Performed by: PHYSICIAN ASSISTANT

## 2024-09-11 PROCEDURE — 3008F BODY MASS INDEX DOCD: CPT | Performed by: PHYSICIAN ASSISTANT

## 2024-09-11 PROCEDURE — 3074F SYST BP LT 130 MM HG: CPT | Performed by: PHYSICIAN ASSISTANT

## 2024-09-11 RX ORDER — PREDNISONE 20 MG/1
TABLET ORAL
Qty: 10 TABLET | Refills: 0 | Status: SHIPPED | OUTPATIENT
Start: 2024-09-11

## 2024-09-11 NOTE — PROGRESS NOTES
CHIEF COMPLAINT:     Chief Complaint   Patient presents with    Sore Throat     Started couple days        HPI:   Cindy Jones is a 34 year old female who presents with cough, congestion, tightness and soreness in throat.  Symptoms for 2 days overall but was worse last night.       Associated symptoms:    Fever/Chills  No  Sore throat  Yes  Cough  Yes   Congestion Yes  Bodyache  No  Headache  No  Chest pain No  SOB/Dyspnea No  Loss of taste No  Loss of smell No  Diarrhea No  Vomiting No    Received covid x1.  [employer] primary series.  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     She is breast feeding.    No history of pneumonia or chronic respiratory problems.    She is a nurse.    Received all routine childhood vaccinations.      Current Outpatient Medications   Medication Sig Dispense Refill    predniSONE 20 MG Oral Tab Take 2 PO once daily for 5 days. 10 tablet 0    Prenatal Multivit-Min-Fe-FA (PRENATAL OR) Take by mouth.        No past medical history on file.   Social History:  Social History     Socioeconomic History    Marital status:    Tobacco Use    Smoking status: Never    Smokeless tobacco: Never   Substance and Sexual Activity    Alcohol use: No    Drug use: No     Social Determinants of Health     Financial Resource Strain: Low Risk  (12/20/2023)    Received from [facility]    Financial Resource Strain     In the past year, have you or any family members you live with been unable to get any of the following when it was really needed? Check all that apply.: None   Food Insecurity: Low Risk  (12/20/2023)    Received from [facility]    Food Insecurity     Within the past 12 months, you worried that your food would run out before you got money to buy more.  : Never true     Within the past 12 months, the food you bought just didn't last and you didn't have money to get more. : Never true   Transportation Needs: Not At Risk (12/20/2023)    Received from [facility]    Transportation Needs     In the past 12 months, has lack of reliable transportation kept you from medical appointments, meetings, work or from getting things needed for daily living? : No   Social Connections: Low Risk  (12/20/2023)    Received from [facility]    Social Connections     How often do you see or talk to people that you care about and feel close to? (For example: talking to friends on the phone, visiting friends or family, going to [employer] or club meetings): 5 or more times a week    Received from [facility]    Housing Stability        Review of Systems:    Positive for stated complaint: cough.   Pertinent positives and negatives noted in the the HPI.    EXAM:   /84   Pulse 104   Temp 98.3 °F (36.8 °C)   Resp 18   Ht 5' 6\" (1.676 m)   Wt 110 lb (49.9 kg)   LMP  (Exact Date)   SpO2 97%   Breastfeeding Yes   BMI 17.75 kg/m²   GENERAL: well developed, well nourished,in no apparent distress  SKIN: no rashes,no suspicious lesions  HEAD: atraumatic, normocephalic  EYES: conjunctiva clear, sclera white,  PERRLA  EARS: TM's non erythematous  NOSE: nares patent, mucosa mild congestion  THROAT: Posterior pharynx is mildy erythematous, tonsils are surgically absent.   NECK: supple, non-tender  LUNGS: clear to auscultation bilaterally without rale, ronchi, wheeze.  CARDIO: S1/S2 without murmur  EXTREMITIES: no cyanosis, clubbing or edema  LYMPH:  no gross lymphadenopathy.      Recent Results (from the past 24 hour(s))   Strep A Assay W/Optic    Collection Time: 09/11/24  8:57 AM   Result Value Ref Range    Strep Grp A Screen neg Negative    Control Line Present with a clear background (yes/no) yes Yes/No    Kit Lot # [id number] Numeric    Kit Expiration Date 5/21/25 Date   COVID-19 LAB TEST NON-CDC    Collection Time: 09/11/24  9:02 AM    Specimen: Nares   Result Value Ref Range    Rapid SARS-CoV-2 by PCR Not Detected Not Detected    POCT Lot Number [id number]      POCT Expiration Date 02/28/2026     POCT Procedure Control Control Valid Control Valid     ,603          ASSESSMENT AND PLAN:   Cindy Jones is a 34 year old female who presents with Sore Throat (Started couple days ). Symptoms are consistent with:      ASSESSMENT:  Encounter Diagnoses   Name Primary?    Cough, unspecified type     Sore throat     Croup Yes       PLAN: Covid Test rapid ID now is negative.    RSS is negative.       Symptomatic care:   1. Rest. Drink plenty of fluids.  2. Tylenol or ibuprofen for discomfort or fever.   3. OTC decongestant (phenylephrine) expectorants (guaifenesin), nasal steroid sprays  (fluticasone) may be helpful        for congestion.  4. OTC cough suppressant for cough  (dextromethorphan)  5. Chloraseptic spray/throat lozenges for sore throat   6 written script for prednisone if progressive croupy cough but advised symptomatic care for now.         Go to the ED for evaluation with progressive symptoms of difficulty swallowing, breathing, shortness of breath, chest pain, extreme weakness, or confusion.         Meds & Refills for this Visit:  Requested Prescriptions     Signed Prescriptions Disp Refills    predniSONE 20 MG Oral Tab 10 tablet 0     Sig: Take 2 PO once daily for 5 days.       Risks, benefits, side effects of medication addressed and explained.    There are no Patient Instructions on file for this visit.    The patient indicates understanding of these issues and agrees to the plan.  The patient is asked to follow up PCP

## 2024-10-14 ENCOUNTER — OFFICE VISIT (OUTPATIENT)
Dept: FAMILY MEDICINE CLINIC | Facility: CLINIC | Age: 34
End: 2024-10-14
Payer: COMMERCIAL

## 2024-10-14 VITALS
SYSTOLIC BLOOD PRESSURE: 129 MMHG | OXYGEN SATURATION: 97 % | BODY MASS INDEX: 18.48 KG/M2 | TEMPERATURE: 97 F | WEIGHT: 115 LBS | RESPIRATION RATE: 18 BRPM | HEIGHT: 66 IN | DIASTOLIC BLOOD PRESSURE: 59 MMHG | HEART RATE: 80 BPM

## 2024-10-14 DIAGNOSIS — H00.014 HORDEOLUM EXTERNUM OF LEFT UPPER EYELID: Primary | ICD-10-CM

## 2024-10-14 PROCEDURE — 3078F DIAST BP <80 MM HG: CPT | Performed by: PHYSICIAN ASSISTANT

## 2024-10-14 PROCEDURE — 3074F SYST BP LT 130 MM HG: CPT | Performed by: PHYSICIAN ASSISTANT

## 2024-10-14 PROCEDURE — 3008F BODY MASS INDEX DOCD: CPT | Performed by: PHYSICIAN ASSISTANT

## 2024-10-14 PROCEDURE — 99213 OFFICE O/P EST LOW 20 MIN: CPT | Performed by: PHYSICIAN ASSISTANT

## 2024-10-14 RX ORDER — CEPHALEXIN 500 MG/1
500 CAPSULE ORAL 3 TIMES DAILY
Qty: 21 CAPSULE | Refills: 0 | Status: SHIPPED | OUTPATIENT
Start: 2024-10-14 | End: 2024-10-21

## 2024-10-14 RX ORDER — ERYTHROMYCIN 5 MG/G
1 OINTMENT OPHTHALMIC EVERY 6 HOURS
Qty: 3.5 G | Refills: 0 | Status: SHIPPED | OUTPATIENT
Start: 2024-10-14 | End: 2024-10-21

## 2024-10-14 NOTE — PROGRESS NOTES
CHIEF COMPLAINT:     Chief Complaint   Patient presents with    Eye Problem     Stye left side started yesterday        HPI:   Cindy Jones is a 34 year old female who presents with chief complaint of bump on left upper eyelid. Symptoms began 1 days ago. Symptoms have been worsening since onset.   Patient reports left upper eyelid redness, tenderness, and + swelling.  Denies eye redness,  eyelid crusting, discharge, itching, and tearing.  Contact lens wearer: no.  Denies fever, change in vision, sensitivity to light, eye pain, cold symptoms, foreign body sensation, eye injury, or contact with irritant.  Treatments tried: started keflex- 3 doses.  Patient explains she has had multiple styes and they always need to be treated with oral abx.  Patient's eye doctor treats with keflex TID x 7 days and this is documented in EPIC as well.     Current Outpatient Medications   Medication Sig Dispense Refill    cephALEXin (KEFLEX) 500 MG Oral Cap Take 1 capsule (500 mg total) by mouth 3 (three) times daily for 7 days. 21 capsule 0    erythromycin 5 MG/GM Ophthalmic Ointment Place 1 Application into the left eye every 6 (six) hours for 7 days. 3.5 g 0    predniSONE 20 MG Oral Tab Take 2 PO once daily for 5 days. (Patient not taking: Reported on 10/14/2024) 10 tablet 0    Prenatal Multivit-Min-Fe-FA (PRENATAL OR) Take by mouth.        No past medical history on file.   Past Surgical History:   Procedure Laterality Date    Other surgical history      breast implants    Tonsillectomy        Family History   Problem Relation Age of Onset    Hypertension Father     Mental Disorder Father         anziety/depression      Social History     Socioeconomic History    Marital status:    Tobacco Use    Smoking status: Never    Smokeless tobacco: Never   Substance and Sexual Activity    Alcohol use: No    Drug use: No     Social Drivers of Health     Financial Resource Strain: Low Risk  (12/20/2023)    Received [facility]    Financial Resource Strain     In the past year, have you or any family members you live with been unable to get any of the following when it was really needed? Check all that apply.: None   Food Insecurity: Low Risk  (12/20/2023)    Received from [facility]    Food Insecurity     Within the past 12 months, you worried that your food would run out before you got money to buy more.  : Never true     Within the past 12 months, the food you bought just didn't last and you didn't have money to get more. : Never true   Transportation Needs: Not At Risk (12/20/2023)    Received from [facility]    Transportation Needs     In the past 12 months, has lack of reliable transportation kept you from medical appointments, meetings, work or from getting things needed for daily living? : No   Social Connections: Low Risk  (12/20/2023)    Received from [facility]    Social Connections     How often do you see or talk to people that you care about and feel close to? (For example: talking to friends on the phone, visiting friends or family, going to [employer] or club meetings): 5 or more times a week    Received from [facility]    Housing Stability         REVIEW OF SYSTEMS:   GENERAL: feels well otherwise  SKIN: no rashes  EYES:denies blurred vision or double vision. See HPI  HENT: denies ear pain, congestion, sore throat  LUNGS: denies shortness of breath or cough  CARDIOVASCULAR: denies chest pain or palpitations   GI: denies N/V/C or abdominal pain  NEURO: denies headaches     EXAM:   /59   Pulse 80   Temp 97.4 °F (36.3 °C)   Resp 18   Ht 5' 6\" (1.676 m)   Wt 115 lb (52.2 kg)   LMP  (LMP Unknown)   SpO2 97%   Breastfeeding Yes   BMI 18.56 kg/m²   GENERAL: well developed, well nourished, in no apparent distress  SKIN: no rashes,no suspicious lesions  EYES: PERRLA, EOM, conjunctiva clear, no discharge.  Left upper eyelid with erythematous stye with +minimal  lid swelling; + tenderness.  No swelling or redness of periorbital tissue.  See vision exam.  HENT: atraumatic, normocephalic,ears and throat are clear  NECK: supple, non tender  LUNGS: clear to auscultation bilaterally.   CARDIO: RRR without murmur  LYMPH: No preauricular lymphadenopathy. No cervical lymphadenopathy      ASSESSMENT AND PLAN:   Cindy Jones is a 34 year old female who presents with:    ASSESSMENT:   Encounter Diagnosis   Name Primary?    Hordeolum externum of left upper eyelid Yes       PLAN: Hygiene and comfort care as listed in patient instructions.  Discussed Medication and instructions as listed below; discussed eyelid massage.  To f/u with PCP if symptoms worsen or if no improvement in 2-3 days.      Requested Prescriptions     Signed Prescriptions Disp Refills    cephALEXin (KEFLEX) 500 MG Oral Cap 21 capsule 0     Sig: Take 1 capsule (500 mg total) by mouth 3 (three) times daily for 7 days.    erythromycin 5 MG/GM Ophthalmic Ointment 3.5 g 0     Sig: Place 1 Application into the left eye every 6 (six) hours for 7 days.       Risks, benefits, complications and side effects of meds discussed.        Patient Instructions   -Warm compress  -Eye doctor or ER advised with any worsening symptoms    The patient is asked to follow up with their PCP if symptoms worsen or no improvement in 2-3 days.

## 2024-12-03 ENCOUNTER — OFFICE VISIT (OUTPATIENT)
Dept: FAMILY MEDICINE CLINIC | Facility: CLINIC | Age: 34
End: 2024-12-03
Payer: COMMERCIAL

## 2024-12-03 VITALS
RESPIRATION RATE: 16 BRPM | BODY MASS INDEX: 18.64 KG/M2 | DIASTOLIC BLOOD PRESSURE: 74 MMHG | OXYGEN SATURATION: 98 % | SYSTOLIC BLOOD PRESSURE: 110 MMHG | TEMPERATURE: 99 F | HEART RATE: 105 BPM | WEIGHT: 116 LBS | HEIGHT: 66 IN

## 2024-12-03 DIAGNOSIS — J02.0 STREP PHARYNGITIS: Primary | ICD-10-CM

## 2024-12-03 LAB
CONTROL LINE PRESENT WITH A CLEAR BACKGROUND (YES/NO): YES YES/NO
KIT LOT #: ABNORMAL NUMERIC
STREP GRP A CUL-SCR: POSITIVE

## 2024-12-03 PROCEDURE — 3008F BODY MASS INDEX DOCD: CPT | Performed by: NURSE PRACTITIONER

## 2024-12-03 PROCEDURE — 3074F SYST BP LT 130 MM HG: CPT | Performed by: NURSE PRACTITIONER

## 2024-12-03 PROCEDURE — 3078F DIAST BP <80 MM HG: CPT | Performed by: NURSE PRACTITIONER

## 2024-12-03 PROCEDURE — 87637 SARSCOV2&INF A&B&RSV AMP PRB: CPT | Performed by: NURSE PRACTITIONER

## 2024-12-03 PROCEDURE — 99213 OFFICE O/P EST LOW 20 MIN: CPT | Performed by: NURSE PRACTITIONER

## 2024-12-03 PROCEDURE — 87880 STREP A ASSAY W/OPTIC: CPT | Performed by: NURSE PRACTITIONER

## 2024-12-03 RX ORDER — AMOXICILLIN 500 MG/1
500 CAPSULE ORAL 2 TIMES DAILY
Qty: 20 CAPSULE | Refills: 0 | Status: SHIPPED | OUTPATIENT
Start: 2024-12-03 | End: 2024-12-13

## 2024-12-03 NOTE — PATIENT INSTRUCTIONS
1. Rest. Drink plenty of fluids.  2. Tylenol/Ibuprofen for pain/fevers. Amoxicillin as prescribed.  3. Salt water gargles three times daily  4. Use humidifier at home when possible.  5. The rapid strep test is positive.  6. Covid-19/FLU/RSV testing sent to lab.  If covid-19 test is positive then please follow the listed guidelines below:  Home isolation until:  Your symptoms are improving AND  You are fever free for 24 hours without using fever reducing medications  Resume normal activities, and use added prevention strategies over the next five days.  Clean your hands often  wear a well-fitting mask when around others  keep a distance from others    7. Follow up with PMD in 4-5 days for re-eval. Go to the emergency department immediately if symptoms worsen, change, you develop chest discomfort, wheezing, shortness of breath, or if you have any concerns.

## 2024-12-03 NOTE — PROGRESS NOTES
CHIEF COMPLAINT:     Chief Complaint   Patient presents with    Sore Throat     2 days, sore throat, body ache, headache  OTC tylenol, motrin       HPI:   Cindy Jones is a 34 year old female who presents for sore throat, chills, and body aches. Patient reports symtpoms present x 2 days.  Denies any fevers, cough,wheezing, chest discomfort, or shortness of breath.   Tolerates PO well at home. No n/v/d.  Taking tylenol and motrin at home. Denies any other aggravating or relieving factors at home. Denies known covid-19 or strep exposure. Pt's son does have RSV.    Current Outpatient Medications   Medication Sig Dispense Refill    amoxicillin 500 MG Oral Cap Take 1 capsule (500 mg total) by mouth 2 (two) times daily for 10 days. 20 capsule 0      No past medical history on file.   Past Surgical History:   Procedure Laterality Date    Other surgical history      breast implants    Tonsillectomy           Social History     Socioeconomic History    Marital status:    Tobacco Use    Smoking status: Never    Smokeless tobacco: Never   Substance and Sexual Activity    Alcohol use: No    Drug use: No     Social Drivers of Health     Financial Resource Strain: Low Risk  (12/20/2023)    Received from [facility]    Financial Resource Strain     In the past year, have you or any family members you live with been unable to get any of the following when it was really needed? Check all that apply.: None   Food Insecurity: Low Risk  (12/20/2023)    Received from [facility]    Food Insecurity     Within the past 12 months, you worried that your food would run out before you got money to buy more.  : Never true     Within the past 12 months, the food you bought just didn't last and you didn't have money to get more. : Never true   Transportation Needs: Not At Risk (12/20/2023)    Received from [facility]    Transportation Needs     In the past 12 months, has lack of reliable transportation  kept you from medical appointments, meetings, work or from getting things needed for daily living? : No   Social Connections: Low Risk  (12/20/2023)    Received from [facility]    Social Connections     How often do you see or talk to people that you care about and feel close to? (For example: talking to friends on the phone, visiting friends or family, going to [employer] or club meetings): 5 or more times a week    Received from [facility]    Housing Stability         REVIEW OF SYSTEMS:   GENERAL: + tactile fevers, chills and body aches. Notes good appetite  SKIN: no rashes or abnormal skin lesions  HEENT: + sore throat, Denies sinus symptoms, or ear pain  LUNGS: Denies cough, denies shortness of breath or wheezing,   CARDIOVASCULAR: denies chest pain or palpitations   GI: denies N/V/C or abdominal pain  NEURO: Denies headaches    EXAM:   /74   Pulse 105   Temp 98.7 °F (37.1 °C)   Resp 16   Ht 5' 6\" (1.676 m)   Wt 116 lb (52.6 kg)   LMP  (LMP Unknown)   SpO2 98%   Breastfeeding No   BMI 18.72 kg/m²   GENERAL: well developed, well nourished,in no apparent distress  SKIN: no rashes,no suspicious lesions  HEAD: atraumatic, normocephalic.    EYES: conjunctiva clear  EARS: TM's intact and without erythema, no bulging, no retraction,no fluid, bony landmarks visualized. No erythema or swelling noted to ear canals or external ears.   NOSE: Nostrils patent, no nasal discharge, nasal mucosa reddened   THROAT: Oral mucosa pink, moist. Posterior pharynx is erythematous. No exudates. No tonsillar hypertrophy noted.  No trismus. Uvula midline with no swelling. Voice clear/normal. No stridor  NECK: Supple, non-tender  LUNGS: clear to auscultation bilaterally, no rales, wheezes or rhonchi. Breathing is non labored.  CARDIO: RRR without murmur  EXTREMITIES: no cyanosis, clubbing or edema  LYMPH:  + ant cerv lymphadenopathy.        ASSESSMENT AND PLAN:       ICD-10-CM    1. Strep  pharyngitis  J02.0 Strep A Assay W/Optic     SARS-CoV-2/Flu A and B/RSV by PCR ([facility])     amoxicillin 500 MG Oral Cap     SARS-CoV-2/Flu A and B/RSV by PCR ([facility])        Rapid strep positive  Viral panel testing sent to lab.      Discussed physical exam and hpi with pt.  Pt has reassuring physical exam consistent with pharyngitis. Rapid strep positive. No signs of pta or retropharyngeal infection.Lungs clear bilat. No respiratory distress noted. Will send viral panel to lab to rule out concurrent flu or covid infction Treatment options discussed with patient and explained in detail. We reviewed symptomatic care at home and will start amoxicillin for strep pharyngitis.. The risks, benefits and potential side effects of possible medications were reviewed. Alternatives were discussed. Monitoring parameters and expected course outlined. We reviewed self quarantine guidelines for if covid-19 test is positive. . Patient to call PCP or go to emergency department if symptoms fail to respond as outlined, or worsen in any way. The patient agreed with the plan.     Patient Instructions   1. Rest. Drink plenty of fluids.  2. Tylenol/Ibuprofen for pain/fevers. Amoxicillin as prescribed.  3. Salt water gargles three times daily  4. Use humidifier at home when possible.  5. The rapid strep test is positive.  6. Covid-19/FLU/RSV testing sent to lab.  If covid-19 test is positive then please follow the listed guidelines below:  Home isolation until:  Your symptoms are improving AND  You are fever free for 24 hours without using fever reducing medications  Resume normal activities, and use added prevention strategies over the next five days.  Clean your hands often  wear a well-fitting mask when around others  keep a distance from others    7. Follow up with PMD in 4-5 days for re-eval. Go to the emergency department immediately if symptoms worsen, change, you develop chest discomfort, wheezing, shortness of breath, or if you  have any concerns.

## 2024-12-04 LAB
FLUAV + FLUBV RNA SPEC NAA+PROBE: NOT DETECTED
FLUAV + FLUBV RNA SPEC NAA+PROBE: NOT DETECTED
RSV RNA SPEC NAA+PROBE: DETECTED
SARS-COV-2 RNA RESP QL NAA+PROBE: NOT DETECTED

## 2025-07-07 ENCOUNTER — OFFICE VISIT (OUTPATIENT)
Dept: FAMILY MEDICINE CLINIC | Facility: CLINIC | Age: 35
End: 2025-07-07
Payer: COMMERCIAL

## 2025-07-07 VITALS
DIASTOLIC BLOOD PRESSURE: 80 MMHG | OXYGEN SATURATION: 99 % | RESPIRATION RATE: 18 BRPM | BODY MASS INDEX: 19.29 KG/M2 | TEMPERATURE: 98 F | HEIGHT: 66 IN | SYSTOLIC BLOOD PRESSURE: 130 MMHG | WEIGHT: 120 LBS | HEART RATE: 94 BPM

## 2025-07-07 DIAGNOSIS — J02.9 SORE THROAT: Primary | ICD-10-CM

## 2025-07-08 NOTE — PROGRESS NOTES
CHIEF COMPLAINT:     Chief Complaint   Patient presents with    Sore Throat     Started 2 days ago          HPI:   Cindy Jones is a 35 year old female presents to clinic with complaint of sore throat. Patient has had for 2 days. Symptoms have been unchanged since onset.  Patient reports following associated symptoms: slight cough Has  history of strep throat. no strep pharyngitis exposure.  Treating symptoms with tylenol.    Current Medications[1]   Past Medical History[2]   Social History:  Short Social Hx on File[3]     REVIEW OF SYSTEMS:   GENERAL HEALTH: normal appetite  SKIN: denies any unusual skin lesions or rashes  HEENT: denies ear pain, See HPI  RESPIRATORY: denies shortness of breath or wheezing  CARDIOVASCULAR: denies chest pain or palpitations   GI: denies vomiting or diarrhea  NEURO: denies dizziness or lightheadedness    EXAM:   /80   Pulse 94   Temp 97.6 °F (36.4 °C)   Resp 18   Ht 5' 6\" (1.676 m)   Wt 120 lb (54.4 kg)   LMP 05/30/2025   SpO2 99%   BMI 19.37 kg/m²   GENERAL: well developed, well nourished,in no apparent distress  SKIN: no rashes,no suspicious lesions  HEAD: atraumatic, normocephalic  EYES: conjunctiva clear, EOM intact  EARS: TM's clear, non-injected, no bulging, retraction, or fluid bilaterally  NOSE: nostrils patent, no nasal discharge, nasal mucosa pink  THROAT: oral mucosa pink, moist. Posterior pharynx erythematous and injected. no exudates. Tonsils -/4.  Breath not malodorous   NECK: supple  LUNGS: clear to auscultation bilaterally, no wheezes or rhonchi. Breathing is non labored.  CARDIO: RRR without murmur  GI: good BS's,no masses, hepatosplenomegaly, or tenderness on direct palpation  EXTREMITIES: no cyanosis, clubbing or edema  LYMPH: no anterior cervical. no submandibular lymphadenopathy.  No posterior cervical or occipital lymphadenopathy.    Recent Results (from the past 24 hours)   Strep A Assay W/Optic    Collection Time: 07/07/25  7:30 PM   Result  Value Ref Range    Strep Grp A Screen Negative Negative    Control Line Present with a clear background (yes/no) yes Yes/No    Kit Lot # [id number] Numeric    Kit Expiration Date 05/09/2026 Date         ASSESSMENT AND PLAN:   Assessment: 1.sore throat: Rapid strep screen is negative     Plan: Discussed that due to symptoms and negative rapid strep this is most likely viral and does not require antibiotics. Will not send throat culture- discussed with patient and patient declines    Educated on viral vs bacterial  Educated on supportive measures: ibuprofen/tylenol, hydration, Educated on s/s of worsening sx and when to seek higher level of care  Follow up with pcp if not improving            [1]   No current outpatient medications on file.   [2] History reviewed. No pertinent past medical history.  [3]   Social History  Socioeconomic History    Marital status:    Tobacco Use    Smoking status: Never    Smokeless tobacco: Never   Substance and Sexual Activity    Alcohol use: No    Drug use: No     Social Drivers of Health     Food Insecurity: Low Risk  (12/20/2023)    Received from [facility]    Food Insecurity     Within the past 12 months, you worried that your food would run out before you got money to buy more.  : Never true     Within the past 12 months, the food you bought just didn't last and you didn't have money to get more. : Never true   Transportation Needs: Not At Risk (12/20/2023)    Received from [facility]    Transportation Needs     In the past 12 months, has lack of reliable transportation kept you from medical appointments, meetings, work or from getting things needed for daily living? : No    Received from [facility]    Housing Stability

## 2025-07-28 ENCOUNTER — E-ADVICE (OUTPATIENT)
Dept: OPHTHALMOLOGY | Age: 35
End: 2025-07-28

## 2025-07-28 RX ORDER — CEPHALEXIN 500 MG/1
500 CAPSULE ORAL 3 TIMES DAILY
Qty: 21 CAPSULE | Refills: 0 | Status: SHIPPED | OUTPATIENT
Start: 2025-07-28 | End: 2025-08-04

## 2025-08-15 PROBLEM — Z87.59 HISTORY OF GESTATIONAL HYPERTENSION: Status: RESOLVED | Noted: 2025-08-15 | Resolved: 2025-08-15

## 2025-08-15 PROBLEM — O09.819 PREGNANCY RESULTING FROM IN VITRO FERTILIZATION, ANTEPARTUM (CMD): Status: ACTIVE | Noted: 2025-08-15

## 2025-08-15 PROBLEM — Z87.59 HISTORY OF GESTATIONAL HYPERTENSION: Status: ACTIVE | Noted: 2025-08-15

## 2025-08-15 PROBLEM — Z98.82 H/O BREAST AUGMENTATION: Status: ACTIVE | Noted: 2018-06-06

## 2025-08-15 PROBLEM — O99.019 ANTEPARTUM ANEMIA (CMD): Status: RESOLVED | Noted: 2018-07-02 | Resolved: 2025-08-15

## 2025-08-15 PROBLEM — Z34.90 PREGNANT (CMD): Status: ACTIVE | Noted: 2018-06-06

## 2025-08-15 PROBLEM — Z00.00 LABORATORY EXAM ORDERED AS PART OF ROUTINE GENERAL MEDICAL EXAMINATION: Status: RESOLVED | Noted: 2023-08-11 | Resolved: 2025-08-15

## 2025-08-15 PROBLEM — O99.019 ANTEPARTUM ANEMIA (CMD): Status: ACTIVE | Noted: 2018-07-02

## 2025-08-15 PROBLEM — Z33.3: Status: ACTIVE | Noted: 2025-08-15

## 2025-08-15 PROBLEM — O09.90 SUPERVISION OF HIGH RISK PREGNANCY, ANTEPARTUM (CMD): Status: ACTIVE | Noted: 2018-06-06

## 2025-08-15 PROBLEM — Z00.00 LABORATORY EXAM ORDERED AS PART OF ROUTINE GENERAL MEDICAL EXAMINATION: Status: ACTIVE | Noted: 2023-08-11

## 2025-08-15 PROBLEM — O09.299 H/O SHOULDER DYSTOCIA IN PRIOR PREGNANCY, CURRENTLY PREGNANT (CMD): Status: ACTIVE | Noted: 2025-08-15

## 2025-08-18 DIAGNOSIS — Z36.3 ENCOUNTER FOR ANTENATAL SCREENING FOR MALFORMATION (CMD): Primary | ICD-10-CM

## 2025-08-18 DIAGNOSIS — O09.819 PREGNANCY RESULTING FROM IN VITRO FERTILIZATION (CMD): ICD-10-CM

## 2025-08-18 DIAGNOSIS — Z36.89 ENCOUNTER FOR FETAL ANATOMIC SURVEY (CMD): ICD-10-CM

## 2025-08-27 ENCOUNTER — LAB SERVICES (OUTPATIENT)
Dept: LAB | Age: 35
End: 2025-08-27

## 2025-08-27 DIAGNOSIS — Z34.81 PRENATAL CARE, SUBSEQUENT PREGNANCY, FIRST TRIMESTER (CMD): ICD-10-CM

## 2025-08-27 LAB
ABO + RH BLD: NORMAL
BASOPHILS # BLD: 0 K/MCL (ref 0–0.3)
BASOPHILS NFR BLD: 1 %
BLD GP AB SCN SERPL QL GEL: NEGATIVE
DEPRECATED RDW RBC: 41.7 FL (ref 39–50)
EOSINOPHIL # BLD: 0 K/MCL (ref 0–0.5)
EOSINOPHIL NFR BLD: 0 %
ERYTHROCYTE [DISTWIDTH] IN BLOOD: 11.9 % (ref 11–15)
HBV SURFACE AG SER QL: NEGATIVE
HCT VFR BLD CALC: 33.7 % (ref 36–46.5)
HCV AB SER QL: NEGATIVE
HGB BLD-MCNC: 11 G/DL (ref 12–15.5)
HIV 1+2 AB+HIV1 P24 AG SERPL QL IA: NONREACTIVE
IMM GRANULOCYTES # BLD AUTO: 0 K/MCL (ref 0–0.2)
IMM GRANULOCYTES # BLD: 1 %
LYMPHOCYTES # BLD: 1.7 K/MCL (ref 1–4.8)
LYMPHOCYTES NFR BLD: 27 %
MCH RBC QN AUTO: 31.1 PG (ref 26–34)
MCHC RBC AUTO-ENTMCNC: 32.6 G/DL (ref 32–36.5)
MCV RBC AUTO: 95.2 FL (ref 78–100)
MONOCYTES # BLD: 0.3 K/MCL (ref 0.3–0.9)
MONOCYTES NFR BLD: 5 %
NEUTROPHILS # BLD: 4.2 K/MCL (ref 1.8–7.7)
NEUTROPHILS NFR BLD: 66 %
NRBC BLD MANUAL-RTO: 0 /100 WBC
PLATELET # BLD AUTO: 328 K/MCL (ref 140–450)
RBC # BLD: 3.54 MIL/MCL (ref 4–5.2)
RUBV IGG SERPL IA-ACNC: 56.7 UNITS/ML
VZV IGG SER QL IA: 25.7 S/CO
VZV IGG SER QL IA: NORMAL
WBC # BLD: 6.3 K/MCL (ref 4.2–11)

## 2025-08-28 LAB — RPR SER QL: NONREACTIVE
